# Patient Record
Sex: MALE | Race: WHITE | Employment: UNEMPLOYED | URBAN - METROPOLITAN AREA
[De-identification: names, ages, dates, MRNs, and addresses within clinical notes are randomized per-mention and may not be internally consistent; named-entity substitution may affect disease eponyms.]

---

## 2019-03-01 ENCOUNTER — OFFICE VISIT (OUTPATIENT)
Dept: INTERNAL MEDICINE CLINIC | Age: 19
End: 2019-03-01

## 2019-03-01 VITALS
TEMPERATURE: 98.3 F | WEIGHT: 182.3 LBS | SYSTOLIC BLOOD PRESSURE: 117 MMHG | HEIGHT: 69 IN | HEART RATE: 66 BPM | DIASTOLIC BLOOD PRESSURE: 71 MMHG | OXYGEN SATURATION: 98 % | RESPIRATION RATE: 20 BRPM | BODY MASS INDEX: 27 KG/M2

## 2019-03-01 DIAGNOSIS — M79.641 RIGHT HAND PAIN: Primary | ICD-10-CM

## 2019-03-01 DIAGNOSIS — S62.634A CLOSED DISPLACED FRACTURE OF DISTAL PHALANX OF RIGHT RING FINGER, INITIAL ENCOUNTER: ICD-10-CM

## 2019-03-01 RX ORDER — METHYLPHENIDATE HYDROCHLORIDE 36 MG/1
36 TABLET ORAL
COMMUNITY

## 2019-03-01 NOTE — PROGRESS NOTES
Chief Complaint Patient presents with  
 Hand Pain  
 
he is a 22y.o. year old male who presents for evaluation of right hand pain Pain Assessment Encounter Ivan Baer 3/1/2019 Onset of Symptoms: 1 month ago 
________________________________________________________________________ Description:pt states he sprained his finger while playing rugby. Pt states he feels it is not healing correctly Frequency: 4-5 times a day Pain Scale:(1-10): 6 Trauma Hx: sports related trauma, rugby Hx of similar symptoms: No:  
Radiation: NO, Duration:  intermittent Progression: has slightly improved What makes it better?: OTC meds and rest 
What makes it worse?:bending and certain movements Medications tried: ibuprofen Reviewed and agree with Nurse Note and duplicated in this note. Reviewed PmHx, RxHx, FmHx, SocHx, AllgHx and updated and dated in the chart. No family history on file. No past medical history on file. Review of Systems - negative except as listed above Objective:  
 
Vitals:  
 03/01/19 1453 BP: 117/71 Pulse: 66 Resp: 20 Temp: 98.3 °F (36.8 °C) TempSrc: Oral  
SpO2: 98% Weight: 182 lb 4.8 oz (82.7 kg) Height: 5' 9\" (1.753 m) Physical Examination: General appearance - alert, well appearing, and in no distress Back exam - full range of motion, no tenderness, palpable spasm or pain on motion Neurological - alert, oriented, normal speech, no focal findings or movement disorder noted Musculoskeletal - right ring finger -pain to palpation of DIP and distal phalanx of fourth digit, swelling noted around DIP, no deformity noted, strength 5 out of 5 with flexion of DIP, slight mallet deformity noted Extremities - peripheral pulses normal, no pedal edema, no clubbing or cyanosis Skin - normal coloration and turgor, no rashes, no suspicious skin lesions noted Assessment/ Plan:  
Diagnoses and all orders for this visit: 1. Right hand pain -     XR HAND RT MIN 3 V; Future 2. Closed displaced fracture of distal phalanx of right ring finger, initial encounter Pathophysiology, recovery and rehabilitation process discussed and questions answered Counseling for 30 Minutes of the total visit duration Pictures and figures used as necessary Provided reassurance Recommend use of splint, no bending finger, assistance with changing splint Recommend activity modification Recommend  lower impact activities-walking, Eliptical, [employer], cycling or swimming 1) Remember to stay active and/or exercise regularly (I suggest 30-45 minutes daily) 2) For reliable dietary information, go to www. EATRIGHT.org. You may wish to consider seeing the nutritionist at [facility] [phone], also consider the [address] I have discussed the diagnosis with the patient and the intended plan as seen in the above orders. The patient has received an after-visit summary and questions were answered concerning future plans. Medication Side Effects and Warnings were discussed with patient, Patient Labs were reviewed and or requested, and 
Patient Past Records were reviewed and or requested  yes Pt agrees to call or return to clinic and/or go to closest ER with any worsening of symptoms. This may include, but not limited to increased fever (>100.4) with NSAIDS or Tylenol, increased edema, confusion, rash, worsening of presenting symptoms.

## 2019-03-18 ENCOUNTER — OFFICE VISIT (OUTPATIENT)
Dept: INTERNAL MEDICINE CLINIC | Age: 19
End: 2019-03-18

## 2019-03-18 VITALS
DIASTOLIC BLOOD PRESSURE: 74 MMHG | BODY MASS INDEX: 26.22 KG/M2 | OXYGEN SATURATION: 100 % | HEART RATE: 84 BPM | WEIGHT: 177 LBS | HEIGHT: 69 IN | SYSTOLIC BLOOD PRESSURE: 117 MMHG

## 2019-03-18 DIAGNOSIS — S62.634A CLOSED DISPLACED FRACTURE OF DISTAL PHALANX OF RIGHT RING FINGER, INITIAL ENCOUNTER: Primary | ICD-10-CM

## 2019-03-18 NOTE — PROGRESS NOTES
No chief complaint on file. he is a 22y.o. year old male who presents for follow up of injury. Follow Up Pain Assessment Encounter      Onset of Symptoms: 6 weeks  ________________________________________________________________________  Description: Pain is now is unchanged      Pain Scale:(1-10): 0 currently 7/10 at its worse  Duration:  intermittent  Radiation: none  What makes it better?: not bending finger  What makes it worse?:bending finger, hitting finger on something  Medications tried: none  Modalities tried: none        Reviewed and agree with Nurse Note and duplicated in this note. Reviewed PmHx, RxHx, FmHx, SocHx, AllgHx and updated and dated in the chart. No family history on file. No past medical history on file. Social History     Socioeconomic History    Marital status: SINGLE     Spouse name: Not on file    Number of children: Not on file    Years of education: Not on file    Highest education level: Not on file   Tobacco Use    Smoking status: Never Smoker    Smokeless tobacco: Never Used   Substance and Sexual Activity    Alcohol use:  Yes    Drug use: No    Sexual activity: Yes     Birth control/protection: Condom        Review of Systems - negative except as listed above      Objective:     Vitals:    03/18/19 1540   BP: 117/74   Pulse: 84   SpO2: 100%   Weight: 177 lb (80.3 kg)   Height: 5' 9\" (1.753 m)       Physical Examination: General appearance - alert, well appearing, and in no distress  Back exam - full range of motion, no tenderness, palpable spasm or pain on motion  Neurological - alert, oriented, normal speech, no focal findings or movement disorder noted  Musculoskeletal - right ring finger -pain to palpation of DIP and distal phalanx of fourth digit, swelling noted around DIP, no deformity noted, strength 5 out of 5 with flexion of DIP, slight mallet deformity noted  Extremities - peripheral pulses normal, no pedal edema, no clubbing or cyanosis  Skin - normal coloration and turgor, no rashes, no suspicious skin lesions noted    Assessment/ Plan:   Diagnoses and all orders for this visit:    1. Closed displaced fracture of distal phalanx of right ring finger, initial encounter  -     XR 4TH FINGER RT MIN 2 V; Future  -     REFERRAL TO HAND SURGERY    No change in x-ray which is normal at this stage. Patient would like a second opinion from orthopedic surgery for surgical fix due to upcoming sports season  Follow-up Disposition:  Return if symptoms worsen or fail to improve. Pathophysiology, recovery and rehabilitation process discussed and questions answered   Counseling for 30 Minutes of the total visit duration   Pictures and figures used as necessary   Provided reassurance   Recommend activity modification   Recommend  lower impact activities-walking, Eliptical, Nordic Track, cycling or swimming   Follow up in 4 week(s)        I have discussed the diagnosis with the patient and the intended plan as seen in the above orders. The patient has received an after-visit summary and questions were answered concerning future plans. Medication Side Effects and Warnings were discussed with patient,  Patient Labs were reviewed and or requested, and  Patient Past Records were reviewed and or requested  yes     Pt agrees to call or return to clinic and/or go to closest ER with any worsening of symptoms. This may include, but not limited to increased fever (>100.4) with NSAIDS or Tylenol, increased edema, confusion, rash, worsening of presenting symptoms.

## 2020-02-27 ENCOUNTER — HOSPITAL ENCOUNTER (OUTPATIENT)
Dept: MRI IMAGING | Age: 20
Discharge: HOME | End: 2020-02-27
Attending: FAMILY MEDICINE
Payer: SELF-PAY

## 2020-02-27 ENCOUNTER — OFFICE VISIT (OUTPATIENT)
Dept: INTERNAL MEDICINE CLINIC | Age: 20
End: 2020-02-27

## 2020-02-27 VITALS
WEIGHT: 171 LBS | RESPIRATION RATE: 14 BRPM | SYSTOLIC BLOOD PRESSURE: 113 MMHG | BODY MASS INDEX: 25.33 KG/M2 | HEIGHT: 69 IN | HEART RATE: 49 BPM | OXYGEN SATURATION: 99 % | DIASTOLIC BLOOD PRESSURE: 70 MMHG

## 2020-02-27 DIAGNOSIS — M25.561 ACUTE PAIN OF RIGHT KNEE: Primary | ICD-10-CM

## 2020-02-27 DIAGNOSIS — M25.561 ACUTE PAIN OF RIGHT KNEE: ICD-10-CM

## 2020-02-27 PROCEDURE — 73721 MRI JNT OF LWR EXTRE W/O DYE: CPT

## 2020-02-27 NOTE — PROGRESS NOTES
Chief Complaint   Patient presents with    Knee Pain     he is a 20y.o. year old male who presents for evaluation of right knee  Pain Assessment Encounter      Jermaine Keys  2/27/2020  Onset of Symptoms: a couple days  ________________________________________________________________________  Description: popped and aching/sharp depending on activity     Frequency: more than 5 times a day  Pain Scale:(1-10): 8 when sharp and 2   Trauma Hx: none  Hx of similar symptoms: Yes  Radiation: NO  Duration:  continuous      Progression: has worsened  What makes it better?: OTC meds and rest  What makes it worse?:exercise and walking  Medications tried: acetaminophen, ibuprofen    Reviewed and agree with Nurse Note and duplicated in this note. Reviewed PmHx, RxHx, FmHx, SocHx, AllgHx and updated and dated in the chart. History reviewed. No pertinent family history. History reviewed. No pertinent past medical history. Social History     Socioeconomic History    Marital status: SINGLE     Spouse name: Not on file    Number of children: Not on file    Years of education: Not on file    Highest education level: Not on file   Tobacco Use    Smoking status: Never Smoker    Smokeless tobacco: Never Used   Substance and Sexual Activity    Alcohol use: Yes    Drug use: No    Sexual activity: Yes     Birth control/protection: Condom        Review of Systems - negative except as listed above      Objective:     Vitals:    02/27/20 0934   BP: 113/70   Pulse: (!) 49   Resp: 14   SpO2: 99%   Weight: 171 lb (77.6 kg)   Height: 5' 9\" (1.753 m)       Physical Examination: General appearance - alert, well appearing, and in no distress  Neurological - alert, oriented, normal speech, no focal findings or movement disorder noted  Musculoskeletal - right knee exam:  The patient'sright Knee  is  normal to inspection.   The ROM is normal and there is flexion to Normal Effusion is: moderate The joint exhibits Negative warmth and Crepitus is: mild. The Alek test is:  positive Joint Line Tenderness is  negative . The [facility] Test is negative  and the Lachman is  positive. The Anterior Drawer is positive. The Posterior Drawer is:  negative. Valgus Stress (for MCL) is:  normal . Varus Stress (for LCL) is  normal  . The Dacia Test is negative and the Apprehension Sign:  negative  Patellar Grind negative   Extremities - peripheral pulses normal, no pedal edema, no clubbing or cyanosis  Skin - normal coloration and turgor, no rashes, no suspicious skin lesions noted    Assessment/ Plan:   Diagnoses and all orders for this visit:    1. Acute pain of right knee  -     XR KNEE RT MIN 4 V; Future  -     MRI KNEE RT WO CONT; Future    MRI for likely ACL tear  Pathophysiology, recovery and rehabilitation process discussed and questions answered   Counseling for 30 Minutes of the total visit duration   Pictures and figures used as necessary   Provided reassurance   Monitor response to Physical Therapy   Recommend activity modification   Recommend  lower impact activities-walking, Eliptical, Nordic Track, cycling or swimming   Follow up in 4 week(s)  Xray's reviewed - within normal limits       1) Remember to stay active and/or exercise regularly (I suggest 30-45 minutes daily)   2) For reliable dietary information, go to www. EATRIGHT.org. You may wish to consider seeing the nutritionist at [facility] [phone], also consider the [address] I have discussed the diagnosis with the patient and the intended plan as seen in the above orders. The patient has received an after-visit summary and questions were answered concerning future plans. Medication Side Effects and Warnings were discussed with patient,  Patient Labs were reviewed and or requested, and  Patient Past Records were reviewed and or requested  yes      Pt agrees to call or return to clinic and/or go to closest ER with any worsening of symptoms.   This may include, but not limited to increased fever (>100.4) with NSAIDS or Tylenol, increased edema, confusion, rash, worsening of presenting symptoms.

## 2020-02-28 DIAGNOSIS — S83.511A RUPTURE OF ANTERIOR CRUCIATE LIGAMENT OF RIGHT KNEE, INITIAL ENCOUNTER: Primary | ICD-10-CM

## 2023-05-19 RX ORDER — METHYLPHENIDATE HYDROCHLORIDE 36 MG/1
36 TABLET, EXTENDED RELEASE ORAL
COMMUNITY